# Patient Record
Sex: FEMALE | Race: WHITE
[De-identification: names, ages, dates, MRNs, and addresses within clinical notes are randomized per-mention and may not be internally consistent; named-entity substitution may affect disease eponyms.]

---

## 2017-10-15 ENCOUNTER — HEALTH MAINTENANCE LETTER (OUTPATIENT)
Age: 62
End: 2017-10-15

## 2020-02-24 ENCOUNTER — HEALTH MAINTENANCE LETTER (OUTPATIENT)
Age: 65
End: 2020-02-24

## 2020-12-13 ENCOUNTER — HEALTH MAINTENANCE LETTER (OUTPATIENT)
Age: 65
End: 2020-12-13

## 2021-04-17 ENCOUNTER — HEALTH MAINTENANCE LETTER (OUTPATIENT)
Age: 66
End: 2021-04-17

## 2021-09-26 ENCOUNTER — HEALTH MAINTENANCE LETTER (OUTPATIENT)
Age: 66
End: 2021-09-26

## 2022-03-13 ENCOUNTER — HEALTH MAINTENANCE LETTER (OUTPATIENT)
Age: 67
End: 2022-03-13

## 2022-05-08 ENCOUNTER — HEALTH MAINTENANCE LETTER (OUTPATIENT)
Age: 67
End: 2022-05-08

## 2023-04-23 ENCOUNTER — HEALTH MAINTENANCE LETTER (OUTPATIENT)
Age: 68
End: 2023-04-23

## 2023-06-02 ENCOUNTER — HEALTH MAINTENANCE LETTER (OUTPATIENT)
Age: 68
End: 2023-06-02

## 2025-05-15 ENCOUNTER — TRANSCRIBE ORDERS (OUTPATIENT)
Dept: OTHER | Age: 70
End: 2025-05-15

## 2025-05-15 ENCOUNTER — PRE VISIT (OUTPATIENT)
Dept: RADIATION ONCOLOGY | Facility: CLINIC | Age: 70
End: 2025-05-15
Payer: COMMERCIAL

## 2025-05-15 ENCOUNTER — PATIENT OUTREACH (OUTPATIENT)
Dept: ONCOLOGY | Facility: CLINIC | Age: 70
End: 2025-05-15
Payer: COMMERCIAL

## 2025-05-15 DIAGNOSIS — C50.111 MALIGNANT NEOPLASM OF CENTRAL PORTION OF RIGHT BREAST IN FEMALE, ESTROGEN RECEPTOR POSITIVE (H): Primary | ICD-10-CM

## 2025-05-15 DIAGNOSIS — Z17.0 MALIGNANT NEOPLASM OF CENTRAL PORTION OF RIGHT BREAST IN FEMALE, ESTROGEN RECEPTOR POSITIVE (H): Primary | ICD-10-CM

## 2025-05-15 NOTE — TELEPHONE ENCOUNTER
MEDICAL RECORDS REQUEST   Radiation Oncology  [address] [place] [address]  Fax: [phone]          FUTURE VISIT INFORMATION                                                   Inna Guillen, : 1955 scheduled for future visit at [facility] Radiation Oncology    RECORDS REQUESTED FOR VISIT                                                     BREAST STATUS DETAILS   OFFICE NOTE from medical oncologist [facility] Consult:  25: Dr. Ryanne Calvert   OFFICE NOTE from surgeon/plastic surgeon  Consult:    F/U:   CHEMOTHERAPY NOTES/LOG     OPERATIVE/BREAST BIOPSY REPORTS     MEDICATION LIST [facility]    LABS     PATHOLOGY REPORTS [facility] 25: [id number]   ANYTHING RELATED TO DIAGNOSIS [facility] Most recnet 25   IMAGING (NEED IMAGES & REPORT)     CT SCANS     MRI     MAMMO/SURGICAL BREAST IMGS/SPECIMEN RADIOGRAPH Req 05/15 [facility]:  25, 25    [facility]:  22, 19   XRAYS     ULTRASOUND Req 05/15 [facility]:  25: US Axilla  25, 25: US Breast   PET     PREVIOUS RADIATION     WHAT HEALTHCARE FACILITY     RADIATION ONCOLOGIST NOTES     RADIATION TREATMENT SUMMARY NOTES        *[facility] Radiation Oncology patients send to [facility] with ATTN: NAZIA/Peyton Dosi/Physics    *only [facility] patient's, use [facility]

## 2025-05-15 NOTE — PROGRESS NOTES
New Patient Oncology Nurse Navigator Note     Referring provider: Dr Patricia Barroso      Referring Clinic/Organization: [facility]     Referred to (specialty:) Radiation Oncology     Requested provider (if applicable): Dr. Radha June     Date Referral Received: May 15, 2025     Evaluation for:  C50.111, Z17.0 (ICD-10-CM) - Malignant neoplasm of central portion of right breast in female, estrogen receptor positive (H)      Clinical History (per Nurse review of records provided):      Inna Guillen is a 70 year old female who presented with a palpable lump in right breast near the nipple and with diffuse breast pain.     Diagnostic breast imaging was performed on 5/2. BILATERAL Diagnostic Mammogram: The LEFT breast is negative for mammographic signs of malignancy. At the site of the palpable marker in  the RIGHT breast there is small mass just under the skin. The palpable marker obscures the area. The remainder of the RIGHT breast is negative.   RIGHT Breast Ultrasound: Ultrasound targeted to the palpable finding at 10 o'clock 2 cm from the nipple shows a solid irregular mass. Measurement is 0.7 x 0.6 x 0.5 cm. This is suspicious for malignancy     5/5/25 Case: [id number]     A) RIGHT BREAST, 10:00, 2 CM FROM NIPPLE, ULTRASOUND-GUIDED CORE BIOPSY:   1. Invasive ductal carcinoma      a. Jamarcus grade: II of III; Cody score: 6 of 9      b. Angio-lymphatic invasion: Absent      c. Associated DCIS: Present      d. Subtype: Cribriform and Solid       e. Grade of DCIS: 2 of 3   2. Breast Ancillary Testing:        a. Hormone Receptors:             Estrogen receptor: Positive (99%, strong staining)             Progesterone receptor: Positive (99%, strong staining)       b. HER2 by IHC: Negative (1+ by manual morphometry)       c. Ki-67: 2%     She consulted with Dr. Ryanne Calvert on 5/12 and patient strongly favors lumpectomy.     She did have genetic counseling consult with Candido Swenson MS, [facility]  on 5/13/25. Patient was given a kit for testing with [email or web address]'s Hereditary Breast and Gynecologic Cancers Panel (21 genes) and indicated she would have draw done at time of lumpectomy on 5/19 5/19 - Procedure with Ryanne Calvert (lumpectomy)  5/27 - Frannie Cao (post op follow up)  Is Oncotype likely to be ordered?   6/16 - Dr. Patricia Barroso    5/15 [id number] - Telephoned and spoke with Inna. Explained my role and purpose for the call. She was curious about the referral and hadn't really understood radiation oncology consult was recommended. We reviewed the typical sequence of events in treatment of early-stage breast cancer and she agreed to proceed. She has my contact information and will reach out with questions. Writer will watch for her progress through surgery, recovery and medical oncology. Writer received referral, reviewed for appropriate plan, and referral transferred to New Patient Scheduling for completion.    Records Location: Care Everywhere, Media, and See Bookmarked material     Records Needed:  Path reports-biopsy and surgery (per protocol)  Pathology reviews (per protocol)  Breast imaging for past 5 years  Systemic imaging (per protocol)  Surgery consult, operative, and progress notes (per protocol)  Medical oncology notes (per protocol)  Genetic results (per protocol)

## 2025-06-01 ENCOUNTER — HEALTH MAINTENANCE LETTER (OUTPATIENT)
Age: 70
End: 2025-06-01

## 2025-06-18 NOTE — PROGRESS NOTES
Dear Colleagues,  Today Inna Guillen was seen in consultation.  IDENTIFICATION: This is a 70 year old woman with right (UOQ) breast cancer, status post lumpectomy only, revealing G2 IDCA with grade 2 DCIS, [id number] ER+/AL+/H2N- disease referred for adjuvant radiation therapy discussion.  HISTORY OF PRESENT ILLNESS: Inna Guillen was in her usual state of health earlier this year. A mmg identified a suspicious mass and then she was seen by Dr. Calvert who palpated the right breast mass.  On May 1, 2025 bilateral diagnostic mammogram at the site of the palpable marker in the right breast there was a small mass just under the skin the remainder of the right breast as well as left breast were negative.  By ultrasound of the right breast at the 10 o'clock position there was an irregular mass measuring 0.7 cm in greatest dimension.  On May 5, 2025 right breast ultrasound-guided biopsy of the mass at the 10 o'clock position was consistent with grade 2 invasive ductal carcinoma with grade 2 DCIS, ER/AL positive HER2/mayte negative Ki-67 was 2%..  A clip was placed.  On May 12, 2025 right axillary ultrasound showed no suspicious mass or abnormal lymph nodes.  On May 19, 2025 Dr. Calvert took her to the OR and she had a right breast lumpectomy only.  Pathology found a single focus of 0.9 cm grade 2 invasive ductal carcinoma with grade 2 DCIS.  Negative invasive margin and there were adequate noninvasive margins.  No LVSI.  No lymph nodes were removed.  This gave her a stage of pT1b NX M0 ER/AL positive HER2/mayte negative.  No specimen radiograph was completed.  Her postoperative course has been unenventful.  She was recently seen by Dr. Barroso.  No systemic chemotherapy is recommended.  She was started on Anastrazole/endocrine therapy.    Since her surgery, she has continued to heal well and denies any significant pain.  She has good ROM.  She denies any other new masses, skin changes, shortness of breath, chest or bony pain,  or new neurologic symptoms. She is being seen here today for consideration of postoperative radiotherapy.  REVIEW OF SYSTEMS: As per HPI, a 14-point review of system is otherwise negative.  PAST RADIATION THERAPY:  Denies.  PAST CTD/PACEMAKER: Denies  BREAST RISK FACTORS:  Left breast biopsy benign per patient at age 17. No family history ovarian cancer. Sister with breast cancer at age 54 joins her today.  She started her menses at age 15.   with her first delivery at age 26. She did not breastfeed.  Hysterectomy at age 44. HRT x1 year. No OCP use.    Past Medical History:   Diagnosis Date    Anxiety     Diverticulosis of colon     GERD (gastroesophageal reflux disease)     Hypothyroid      Past Surgical History:   Procedure Laterality Date    BIOPSY BREAST  age 19    left breast     COLONOSCOPY  2013    Procedure: COMBINED COLONOSCOPY, SINGLE BIOPSY/POLYPECTOMY BY BIOPSY;  COLONOSCOPY-LOWER GI SYMPTOMS / GRAY;  Surgeon: Jorge Mark MD;  Location: [facility] OR    D & C      HYSTERECTOMY, PAP NO LONGER INDICATED      ovaries removed    LAPAROSCOPIC TUBAL LIGATION  age 34     Family History   Problem Relation Age of Onset    Heart Disease Mother     Cancer Father     Cerebrovascular Disease Maternal Grandmother     Cerebrovascular Disease Maternal Grandfather     Cancer Paternal Grandmother     Breast Cancer Sister      Social History     Tobacco Use    Smoking status: Never    Smokeless tobacco: Not on file   Substance Use Topics    Alcohol use: Yes     Comment: rare     Current Outpatient Medications   Medication Sig Dispense Refill    cholecalciferol (VITAMIN D3) 5000 UNIT CAPS capsule Take 1 capsule by mouth daily.      ferrous sulfate 325 (65 FE) MG tablet Take 1 tablet by mouth daily (with breakfast). 30 tablet 2    Glucosamine-Chondroit-Vit C-Mn (GLUCOSAMINE 1500 COMPLEX) CAPS Take 1 tablet by mouth daily.      levothyroxine (SYNTHROID, LEVOTHROID) 50 MCG tablet Take 1 tablet by mouth  daily. 90 tablet 4    OMEPRAZOLE PO None Entered      paroxetine (PAXIL) 10 MG tablet Take 1 tablet by mouth daily. 90 tablet 4     No current facility-administered medications for this visit.     No Known Allergies  PHYSICAL EXAMINATION:  /68 (BP Location: Left arm, Patient Position: Chair, Cuff Size: Adult Large)   Pulse 65   Temp 98  F (36.7  C) (Oral)   Resp 18   SpO2 97%   GENERAL Well-appearing woman in no acute distress.  HEENT Normocephalic, atraumatic.  Sclerae anicteric.  CVR  Regular rate and rhythm.  No murmurs, rubs, or gallops.  LUNGS Clear to auscultation bilaterally.  BREASTS Breasts are examined in the supine and upright position.  The breasts are symmetric.  The left breast is unremarkable, as there is no erythema, ulceration or suspicious nodularity within it.  Within the right periareolar breast there is a well healed incision.  Firmness of this incision is not suspicious.  There is no suspicious erythema, ulceration or nodularity within the right breast.  There is no erythema, retraction, desquamation or discharge appreciated within the right or left nipple areolar complex.    LYMPH No supraclavicular, infraclavicular, or axillary lymphadenopathy appreciated bilaterally.  ABDOMEN Soft.    EXT  No clubbing or cyanosis or edema.    NEURO No gross deficits.  MSK  Good ROM.   SKIN  Warm and well perfused.    PSYCH  Alert and oriented x 3    ECOG PERFORMANCE STATUS: 0    IMPRESSION/PLAN: Inna Guillen is a 70 year old woman with right (UOQ) breast cancer, status post lumpectomy only, revealing G2 IDCA with grade 2 DCIS, [id number] ER+/CA+/H2N- disease referred for adjuvant radiation therapy discussion.  Today we discussed that multiple randomized prospective data show decreased?risk of local recurrence by 50% with the addition of XRT to BCS. ? However we also discussed details of the CALGB study that looked at omission of radiation in low risk elderly women with early stage disease  (clinically T1N0M0).  In this study patients were required to have a clinically negative axilla, axillary node dissection was allowed but not encouraged.  Study was conducted during a time when SLNBx were uncommon.  Therefore in this study examination of the nodes was optional.  Given this data and her histology I think she has a favorable prognosis.  Although there is a benefit from XRT it is small.  Even in the CALGB study, although treatment with Ceballos alone had low risk of recurrence, treatment with Ceballos + XRT was better in regards to local control. At 10 years, 98% of patients receiving Ceballos +XRT compared with 90% of those receiving Ceballos alone were free from locoregional recurrences.  Looking at axillary recurrences specifically, there were six axillary recurrences of 200 in the Ceballos only group.  Therefore, although there is some benefit from XRT in this setting it is weighed in light of her comorbidities and potential toxicities from XRT.  Patient stated that she is currently taking her endocrine therapy and tolerating it well.  The risks, benefits, treatment rationale and regimen of radiation therapy to the right breast were also discussed with the patient and her sister today. ?Risks include but are not limited to skin erythema, desquamation, hyperpigmentation, breast edema/fibrosis, rib fracture, pneumonitis,?cardiac toxicity?and secondary malignancy. The patient politely declined radiotherapy and will continue follow-up with Dr. Barroso for surveillance imaging and endocrine therapy.   There was ample time for questions and all were answered to the patient's satisfaction. Thank you for allowing me to participate in the care of this pleasant patient. If you have any questions, please do not hesitate to contact my office.    Sincerely,  Juan Pablo June MD

## 2025-06-19 ENCOUNTER — OFFICE VISIT (OUTPATIENT)
Dept: RADIATION ONCOLOGY | Facility: CLINIC | Age: 70
End: 2025-06-19
Payer: COMMERCIAL

## 2025-06-19 VITALS
HEART RATE: 65 BPM | OXYGEN SATURATION: 97 % | SYSTOLIC BLOOD PRESSURE: 129 MMHG | TEMPERATURE: 98 F | RESPIRATION RATE: 18 BRPM | DIASTOLIC BLOOD PRESSURE: 68 MMHG

## 2025-06-19 DIAGNOSIS — C50.411 MALIGNANT NEOPLASM OF UPPER-OUTER QUADRANT OF RIGHT BREAST IN FEMALE, ESTROGEN RECEPTOR POSITIVE (H): Primary | ICD-10-CM

## 2025-06-19 DIAGNOSIS — Z17.0 MALIGNANT NEOPLASM OF UPPER-OUTER QUADRANT OF RIGHT BREAST IN FEMALE, ESTROGEN RECEPTOR POSITIVE (H): Primary | ICD-10-CM

## 2025-06-19 RX ORDER — ANASTROZOLE 1 MG/1
1 TABLET ORAL DAILY
COMMUNITY
Start: 2025-06-16 | End: 2026-06-11

## 2025-06-19 ASSESSMENT — PAIN SCALES - GENERAL: PAINLEVEL_OUTOF10: NO PAIN (0)

## 2025-06-19 NOTE — LETTER
6/19/2025      Inna Guillen  [address] [facility] [address]      Dear Colleague,    Thank you for referring your patient, Inna Guillen, to the [facility] RADIATION ONCOLOGY [place]. Please see a copy of my visit note below.    Dear Colleagues,  Today Inna Guillen was seen in consultation.  IDENTIFICATION: This is a 70 year old woman with right (UOQ) breast cancer, status post lumpectomy only, revealing G2 IDCA with grade 2 DCIS, [id number] ER+/OH+/H2N- disease referred for adjuvant radiation therapy discussion.  HISTORY OF PRESENT ILLNESS: Inna Guillen was in her usual state of health earlier this year. A mmg identified a suspicious mass and then she was seen by Dr. Calvert who palpated the right breast mass.  On May 1, 2025 bilateral diagnostic mammogram at the site of the palpable marker in the right breast there was a small mass just under the skin the remainder of the right breast as well as left breast were negative.  By ultrasound of the right breast at the 10 o'clock position there was an irregular mass measuring 0.7 cm in greatest dimension.  On May 5, 2025 right breast ultrasound-guided biopsy of the mass at the 10 o'clock position was consistent with grade 2 invasive ductal carcinoma with grade 2 DCIS, ER/OH positive HER2/mayte negative Ki-67 was 2%..  A clip was placed.  On May 12, 2025 right axillary ultrasound showed no suspicious mass or abnormal lymph nodes.  On May 19, 2025 Dr. Calvert took her to the OR and she had a right breast lumpectomy only.  Pathology found a single focus of 0.9 cm grade 2 invasive ductal carcinoma with grade 2 DCIS.  Negative invasive margin and there were adequate noninvasive margins.  No LVSI.  No lymph nodes were removed.  This gave her a stage of pT1b NX M0 ER/OH positive HER2/mayte negative.  No specimen radiograph was completed.  Her postoperative course has been unenventful.  She was recently seen by Dr. Barroso.  No systemic chemotherapy is  recommended.  She was started on Anastrazole/endocrine therapy.    Since her surgery, she has continued to heal well and denies any significant pain.  She has good ROM.  She denies any other new masses, skin changes, shortness of breath, chest or bony pain, or new neurologic symptoms. She is being seen here today for consideration of postoperative radiotherapy.  REVIEW OF SYSTEMS: As per HPI, a 14-point review of system is otherwise negative.  PAST RADIATION THERAPY:  Denies.  PAST CTD/PACEMAKER: Denies  BREAST RISK FACTORS:  Left breast biopsy benign per patient at age 17. No family history ovarian cancer. Sister with breast cancer at age 54 joins her today.  She started her menses at age 15.   with her first delivery at age 26. She did not breastfeed.  Hysterectomy at age 44. HRT x1 year. No OCP use.    Past Medical History:   Diagnosis Date     Anxiety      Diverticulosis of colon      GERD (gastroesophageal reflux disease)      Hypothyroid      Past Surgical History:   Procedure Laterality Date     BIOPSY BREAST  age 19    left breast      COLONOSCOPY  2013    Procedure: COMBINED COLONOSCOPY, SINGLE BIOPSY/POLYPECTOMY BY BIOPSY;  COLONOSCOPY-LOWER GI SYMPTOMS / GRAY;  Surgeon: Jorge Mark MD;  Location: [facility] OR     D & C       HYSTERECTOMY, PAP NO LONGER INDICATED      ovaries removed     LAPAROSCOPIC TUBAL LIGATION  age 34     Family History   Problem Relation Age of Onset     Heart Disease Mother      Cancer Father      Cerebrovascular Disease Maternal Grandmother      Cerebrovascular Disease Maternal Grandfather      Cancer Paternal Grandmother      Breast Cancer Sister      Social History     Tobacco Use     Smoking status: Never     Smokeless tobacco: Not on file   Substance Use Topics     Alcohol use: Yes     Comment: rare     Current Outpatient Medications   Medication Sig Dispense Refill     cholecalciferol (VITAMIN D3) 5000 UNIT CAPS capsule Take 1 capsule by mouth daily.        ferrous sulfate 325 (65 FE) MG tablet Take 1 tablet by mouth daily (with breakfast). 30 tablet 2     Glucosamine-Chondroit-Vit C-Mn (GLUCOSAMINE 1500 COMPLEX) CAPS Take 1 tablet by mouth daily.       levothyroxine (SYNTHROID, LEVOTHROID) 50 MCG tablet Take 1 tablet by mouth daily. 90 tablet 4     OMEPRAZOLE PO None Entered       paroxetine (PAXIL) 10 MG tablet Take 1 tablet by mouth daily. 90 tablet 4     No current facility-administered medications for this visit.     No Known Allergies  PHYSICAL EXAMINATION:  /68 (BP Location: Left arm, Patient Position: Chair, Cuff Size: Adult Large)   Pulse 65   Temp 98  F (36.7  C) (Oral)   Resp 18   SpO2 97%   GENERAL Well-appearing woman in no acute distress.  HEENT Normocephalic, atraumatic.  Sclerae anicteric.  CVR  Regular rate and rhythm.  No murmurs, rubs, or gallops.  LUNGS Clear to auscultation bilaterally.  BREASTS Breasts are examined in the supine and upright position.  The breasts are symmetric.  The left breast is unremarkable, as there is no erythema, ulceration or suspicious nodularity within it.  Within the right periareolar breast there is a well healed incision.  Firmness of this incision is not suspicious.  There is no suspicious erythema, ulceration or nodularity within the right breast.  There is no erythema, retraction, desquamation or discharge appreciated within the right or left nipple areolar complex.    LYMPH No supraclavicular, infraclavicular, or axillary lymphadenopathy appreciated bilaterally.  ABDOMEN Soft.    EXT  No clubbing or cyanosis or edema.    NEURO No gross deficits.  MSK  Good ROM.   SKIN  Warm and well perfused.    PSYCH  Alert and oriented x 3    ECOG PERFORMANCE STATUS: 0    IMPRESSION/PLAN: Inna Guillen is a 70 year old woman with right (UOQ) breast cancer, status post lumpectomy only, revealing G2 IDCA with grade 2 DCIS, [id number] ER+/VA+/H2N- disease referred for adjuvant radiation therapy discussion.  Today we  discussed that multiple randomized prospective data show decreased?risk of local recurrence by 50% with the addition of XRT to BCS. ? However we also discussed details of the CALGB study that looked at omission of radiation in low risk elderly women with early stage disease (clinically T1N0M0).  In this study patients were required to have a clinically negative axilla, axillary node dissection was allowed but not encouraged.  Study was conducted during a time when SLNBx were uncommon.  Therefore in this study examination of the nodes was optional.  Given this data and her histology I think she has a favorable prognosis.  Although there is a benefit from XRT it is small.  Even in the CALGB study, although treatment with Ceballos alone had low risk of recurrence, treatment with Ceballos + XRT was better in regards to local control. At 10 years, 98% of patients receiving Ceballos +XRT compared with 90% of those receiving Ceballos alone were free from locoregional recurrences.  Looking at axillary recurrences specifically, there were six axillary recurrences of 200 in the Ceballos only group.  Therefore, although there is some benefit from XRT in this setting it is weighed in light of her comorbidities and potential toxicities from XRT.  Patient stated that she is currently taking her endocrine therapy and tolerating it well.  The risks, benefits, treatment rationale and regimen of radiation therapy to the right breast were also discussed with the patient and her sister today. ?Risks include but are not limited to skin erythema, desquamation, hyperpigmentation, breast edema/fibrosis, rib fracture, pneumonitis,?cardiac toxicity?and secondary malignancy. The patient politely declined radiotherapy and will continue follow-up with Dr. Barroso for surveillance imaging and endocrine therapy.   There was ample time for questions and all were answered to the patient's satisfaction. Thank you for allowing me to participate in the care of this pleasant  patient. If you have any questions, please do not hesitate to contact my office.    Sincerely,  Juan Pablo June MD            Again, thank you for allowing me to participate in the care of your patient.        Sincerely,        RICHA June MD    Electronically signed

## 2025-06-19 NOTE — NURSING NOTE
"Oncology Rooming Note    June 19, 2025 11:46 AM   Inna Guillen is a 70 year old female who presents for:    Chief Complaint   Patient presents with    Cancer     Radiation oncology consultation with Dr. June     Initial Vitals: /68 (BP Location: Left arm, Patient Position: Chair, Cuff Size: Adult Large)   Pulse 65   Temp 98  F (36.7  C) (Oral)   Resp 18   SpO2 97%  Estimated body mass index is 41.38 kg/m  as calculated from the following:    Height as of 10/30/13: 1.549 m (5' 1\").    Weight as of 10/30/13: 99.3 kg (219 lb). There is no height or weight on file to calculate BSA.  No Pain (0) Comment: Data Unavailable   No LMP recorded. Patient has had a hysterectomy.  Allergies reviewed: Yes  Medications reviewed: Yes    Medications: Medication refills not needed today.  Pharmacy name entered into [employer]:    [id number] - [facility] [address] [facility] [address]    Frailty Screening:   Is the patient here for a new oncology consult visit in cancer care? 1. Yes. Over the past month, have you experienced difficulty or required a caregiver to assist with:   1. Balance, walking or general mobility (including any falls)? NO  2. Completion of self-care tasks such as bathing, dressing, toileting, grooming/hygiene?  NO  3. Concentration or memory that affects your daily life?  NO     PHQ9:  Did this patient require a PHQ9?: No      Clinical concerns: patient presents to clinic with her sister Nayeli for new patient radiation oncology consultation with Dr. June.  Patient reports she is feeling well, denies fatigue, denies pain, denies healing concerns from recent surgery and denies concerns with ROM of bilateral upper extremities.  Patient reports she started taking Anastrozole     Considerations for radiation treatment   Pregnancy status: Female with hysterectomy   Implanted Cardiac Devices: No   Any previous radiation therapy: " No    1st menses: age 15  Patient reports history of three pregnancies with two live births with first pregnancy at age 26.  Denies history of breastfeeding.  Patient reports history of HRT use x 1 year, denies OCP history.  Patient reports hysterectomy at age 44.       Dr. June was notified.      Christie Cervantes RN BSN OCN [facility]

## 2025-06-19 NOTE — PATIENT INSTRUCTIONS
Please contact [facility] RN with questions or concerns following today's appointment.      If you are a patient of Dr. June call: [phone]    Please feel free to leave a detailed message if your call is not answered.    If your call is not received before 3:00 PM, it may not be returned until the following business day.    If you are receiving radiation treatment and need assistance after 3:00 PM or on the weekends, please call [phone] and ask to speak to the radiation oncologist on-call.    Thank you!    [facility] Radiation Oncology Nursing